# Patient Record
Sex: FEMALE | Race: WHITE
[De-identification: names, ages, dates, MRNs, and addresses within clinical notes are randomized per-mention and may not be internally consistent; named-entity substitution may affect disease eponyms.]

---

## 2021-03-03 ENCOUNTER — HOSPITAL ENCOUNTER (OUTPATIENT)
Dept: HOSPITAL 92 - BICMAMMO | Age: 41
Discharge: HOME | End: 2021-03-03
Attending: INTERNAL MEDICINE
Payer: COMMERCIAL

## 2021-03-03 DIAGNOSIS — M85.89: ICD-10-CM

## 2021-03-03 DIAGNOSIS — K50.00: ICD-10-CM

## 2021-03-03 DIAGNOSIS — Z98.84: ICD-10-CM

## 2021-03-03 DIAGNOSIS — Z13.820: Primary | ICD-10-CM

## 2021-03-03 DIAGNOSIS — M13.80: ICD-10-CM

## 2021-03-03 PROCEDURE — 77080 DXA BONE DENSITY AXIAL: CPT

## 2021-03-03 NOTE — BD
EXAM: DEXA bone density examination



HISTORY: 41-year-old postmenopausal female for screening



COMPARISON: None



FINDINGS:

L1--bone mineral density 0.869 g/sq cm; T score -1.1

L2--bone mineral density 1.041 g/sq cm; T score 0.1

L3--bone mineral density 0.950 g/sq cm; T score -1.2

L4--bone mineral density 0.959 g/sq cm; T score -0.9

Total L1-L4--bone mineral density 0.956 g/sq cm; T score -0.8



Left femoral neck--bone mineral density0.687; T score -1.5

Total proximal left femur--bone mineral density 0.852; T score -0.7



IMPRESSION: Osteopenia. This patient has a 10 year WHO fracture risk of a major osteoporotic fracture
 of 3.5% and of a hip fracture of 0.3%.



Reported By: Lui Peter 

Electronically Signed:  3/3/2021 2:45 PM